# Patient Record
Sex: MALE | Race: WHITE | Employment: OTHER | RURAL
[De-identification: names, ages, dates, MRNs, and addresses within clinical notes are randomized per-mention and may not be internally consistent; named-entity substitution may affect disease eponyms.]

---

## 2021-08-08 ENCOUNTER — HOSPITAL ENCOUNTER (EMERGENCY)
Age: 41
Discharge: HOME | End: 2021-08-08
Attending: EMERGENCY MEDICINE
Payer: MEDICAID

## 2021-08-08 DIAGNOSIS — L08.9 INFECTED INSECT BITE OF ABDOMEN, INITIAL ENCOUNTER: Primary | ICD-10-CM

## 2021-08-08 DIAGNOSIS — S30.861A INFECTED INSECT BITE OF ABDOMEN, INITIAL ENCOUNTER: Primary | ICD-10-CM

## 2021-08-08 PROCEDURE — 99281 EMR DPT VST MAYX REQ PHY/QHP: CPT

## 2021-08-08 RX ORDER — CEPHALEXIN 500 MG/1
500 CAPSULE ORAL 3 TIMES DAILY
Qty: 21 CAPSULE | Refills: 0 | Status: SHIPPED | OUTPATIENT
Start: 2021-08-08 | End: 2021-08-15

## 2021-08-08 NOTE — ED PROVIDER NOTES
[facility]  EMERGENCY DEPARTMENT HISTORY AND PHYSICAL EXAM         Date of Service: 8/8/2021   Patient Name: Rakesh Cai   YOB: 1980  Medical Record Number: [id number]    History of Presenting Illness     Chief Complaint   Patient presents with   Vishnu Torres 82        History Provided By:  patient    Additional History:   Rakesh Cai is a 42 y.o. male who presents ambulatory to the ED with cc of erythematous insect bite on abdomen just below umbilicus. Unsure what bit him. Occurred several days ago. The central bite has been draining, though pt thinks his belt buckle might have dug into it. No F/C. There are no other complaints, changes or physical findings at this time. Primary Care Provider: None   Specialist:    Past History     Past Medical History:   No past medical history on file. Past Surgical History:   No past surgical history on file. Family History:   No family history on file. Social History:   Social History     Tobacco Use    Smoking status: Not on file   Substance Use Topics    Alcohol use: Not on file    Drug use: Not on file        Allergies:   Not on File     Review of Systems   Review of Systems   Constitutional: Negative for appetite change, chills and fever. HENT: Negative for congestion. Eyes: Negative for visual disturbance. Respiratory: Negative for cough, shortness of breath and wheezing. Cardiovascular: Negative for chest pain, palpitations and leg swelling. Gastrointestinal: Negative for abdominal pain. Genitourinary: Negative for dysuria, frequency and urgency. Musculoskeletal: Negative for back pain, joint swelling, myalgias and neck stiffness. Skin: Positive for wound. Negative for rash. Neurological: Negative for dizziness, syncope, weakness and headaches. Hematological: Negative for adenopathy. Psychiatric/Behavioral: Negative for behavioral problems and dysphoric mood.        Physical Exam  Physical Exam  Vitals and nursing note reviewed. Constitutional:       General: He is not in acute distress. Appearance: He is well-developed. HENT:      Head: Normocephalic and atraumatic. Eyes:      General: No scleral icterus. Conjunctiva/sclera: Conjunctivae normal.      Pupils: Pupils are equal, round, and reactive to light. Cardiovascular:      Rate and Rhythm: Normal rate and regular rhythm. Heart sounds: No murmur heard. No gallop. Pulmonary:      Effort: Pulmonary effort is normal. No respiratory distress. Breath sounds: No stridor. No wheezing or rales. Abdominal:      General: Bowel sounds are normal. There is no distension. Palpations: Abdomen is soft. There is no mass. Tenderness: There is no abdominal tenderness. There is no guarding or rebound. Musculoskeletal:         General: Normal range of motion. Cervical back: Normal range of motion and neck supple. Lymphadenopathy:      Cervical: No cervical adenopathy. Skin:     General: Skin is warm and dry. Findings: No erythema or rash. Comments: Anterior abd below umbilicus has a 0.5 cm ulcer with clear drainage, surrounded by 5 cm of erythema and mild induration. No fluctuance. Neurological:      Mental Status: He is alert and oriented to person, place, and time. Cranial Nerves: No cranial nerve deficit. Coordination: Coordination normal.         Medical Decision Making   I am the first provider for this patient. I reviewed the vital signs, available nursing notes, past medical history, past surgical history, family history and social history. Old Medical Records: none     Provider Notes:   DDX: Insect bite, cellulitis     ED Course:  5:06 PM   Initial assessment performed. The patients presenting problems have been discussed, and they are in agreement with the care plan formulated and outlined with them.   I have encouraged them to ask questions as they arise throughout their visit.    Progress Notes:  5:09 PM  Will place on abx; F/U PCP. Kendal Paez MD      Procedures:   Procedures    Diagnostic Study Results   Labs -    No results found for this or any previous visit (from the past 12 hour(s)). Radiologic Studies -  The following have been ordered and reviewed:  No orders to display     CT Results  (Last 48 hours)    None        CXR Results  (Last 48 hours)    None            Vital Signs-Reviewed the patient's vital signs. No data found. Medications Given in the ED:  Medications - No data to display    Diagnosis:  Clinical Impression:   1. Infected insect bite of abdomen, initial encounter         Plan:  1:   Follow-up Information     Follow up With Specialties Details Why [address] [facility]   If symptoms worsen [address]  [phone]          2:   Current Discharge Medication List      START taking these medications    Details   cephALEXin (Keflex) 500 mg capsule Take 1 Capsule by mouth three (3) times daily for 7 days. Qty: 21 Capsule, Refills: 0  Start date: 8/8/2021, End date: 8/15/2021           Return to ED if worse. Disposition:  Home  _______________________________   Attestations: This note was performed by Kendal Paez MD in its entirety.   _______________________________

## 2024-05-16 NOTE — PROGRESS NOTES
Ht 1.829 m (6')   Wt 91.2 kg (201 lb)   BMI 27.26 kg/m²      ORTHO EXAMINATION:  Examination Right knee Left knee   Skin Intact Intact   Range of motion 120-0 115-5   Effusion - -   Medial joint line tenderness - +   Lateral joint line tenderness - -   Popliteal tenderness - -   Osteophytes palpable - -   Echo’s - -   Patella crepitus - -   Anterior drawer - -   Lateral laxity - -   Medial laxity - -   Varus deformity - -   Valgus deformity - -   Pretibial edema - -   Calf tenderness - -   Tenderness over the superficial medial collateral ligament at the attachment site on the femur  RADIOGRAPHS:   5/5/24 XR LT KNEE PT FIRST  No acute fracture.     IMPRESSION:      ICD-10-CM    1. Left knee pain, unspecified chronicity  M25.562 [03599] Knee 3V      2. Sprain of medial collateral ligament of left knee, initial encounter  S83.412A         PLAN:  There is no need for surgery or injection at this time. We discussed a possible need for left knee MRI in the future if pain continues. He will follow up as needed.    Documentation by justin Bains, as documented by Galo Sneed MD.

## 2024-05-17 ENCOUNTER — OFFICE VISIT (OUTPATIENT)
Age: 44
End: 2024-05-17
Payer: MEDICAID

## 2024-05-17 VITALS — BODY MASS INDEX: 27.22 KG/M2 | TEMPERATURE: 97 F | WEIGHT: 201 LBS | HEIGHT: 72 IN

## 2024-05-17 DIAGNOSIS — S83.412A SPRAIN OF MEDIAL COLLATERAL LIGAMENT OF LEFT KNEE, INITIAL ENCOUNTER: ICD-10-CM

## 2024-05-17 DIAGNOSIS — M25.562 LEFT KNEE PAIN, UNSPECIFIED CHRONICITY: Primary | ICD-10-CM

## 2024-05-17 PROCEDURE — 99203 OFFICE O/P NEW LOW 30 MIN: CPT | Performed by: SPECIALIST

## 2025-01-07 ENCOUNTER — HOSPITAL ENCOUNTER (EMERGENCY)
Facility: HOSPITAL | Age: 45
Discharge: HOME | End: 2025-01-07
Payer: MEDICAID

## 2025-01-07 VITALS
DIASTOLIC BLOOD PRESSURE: 99 MMHG | RESPIRATION RATE: 18 BRPM | BODY MASS INDEX: 25.06 KG/M2 | WEIGHT: 185 LBS | HEART RATE: 76 BPM | HEIGHT: 72 IN | OXYGEN SATURATION: 99 % | SYSTOLIC BLOOD PRESSURE: 148 MMHG | TEMPERATURE: 98.2 F

## 2025-01-07 DIAGNOSIS — Z79.899 ENCOUNTER FOR MONITORING SUBOXONE MAINTENANCE THERAPY: Primary | ICD-10-CM

## 2025-01-07 DIAGNOSIS — Z51.81 ENCOUNTER FOR MONITORING SUBOXONE MAINTENANCE THERAPY: Primary | ICD-10-CM

## 2025-01-07 PROCEDURE — 99283 EMERGENCY DEPT VISIT LOW MDM: CPT

## 2025-01-07 PROCEDURE — 6360000002 HC RX W HCPCS: Performed by: NURSE PRACTITIONER

## 2025-01-07 RX ORDER — BUPRENORPHINE HYDROCHLORIDE, NALOXONE HYDROCHLORIDE 8; 2 MG/1; MG/1
1 FILM, SOLUBLE BUCCAL; SUBLINGUAL 2 TIMES DAILY
Qty: 4 FILM | Refills: 0 | Status: SHIPPED | OUTPATIENT
Start: 2025-01-07 | End: 2025-01-09

## 2025-01-07 RX ORDER — BUPRENORPHINE HYDROCHLORIDE AND NALOXONE HYDROCHLORIDE DIHYDRATE 8; 2 MG/1; MG/1
1 TABLET SUBLINGUAL ONCE
Status: COMPLETED | OUTPATIENT
Start: 2025-01-07 | End: 2025-01-07

## 2025-01-07 RX ORDER — BUPRENORPHINE HYDROCHLORIDE, NALOXONE HYDROCHLORIDE 8; 2 MG/1; MG/1
FILM, SOLUBLE BUCCAL; SUBLINGUAL
COMMUNITY
Start: 2024-10-28 | End: 2025-01-07

## 2025-01-07 RX ADMIN — BUPRENORPHINE HYDROCHLORIDE AND NALOXONE HYDROCHLORIDE DIHYDRATE 1 TABLET: 8; 2 TABLET SUBLINGUAL at 16:21

## 2025-01-07 ASSESSMENT — PAIN - FUNCTIONAL ASSESSMENT: PAIN_FUNCTIONAL_ASSESSMENT: NONE - DENIES PAIN

## 2025-01-07 NOTE — DISCHARGE INSTRUCTIONS
[facility] in [facility] Fax: [phone], phone: [phone]    [facility] Fax: [phone], phone: [phone]    [place]:   [place] - [phone]  [place]- [phone]  [place]- [phone]  [place]- [phone]  [place]- [phone]  [place]-[phone]  [facility]- [phone]  [place]- [phone]    [facility]:  [place]-  Fax: [phone], phone [phone] or [phone]   [place]- [phone]    [facility]:  [phone]  POC: Serenity Escobar [phone]    [facility]:  [phone]  POC: Shaan Grey [phone]    Peer support warm line ([place]):  [phone]  POC: James Gutierrez  [phone]    [place]:  [phone]  POC: Aaron Luong [phone]

## 2025-01-07 NOTE — ED TRIAGE NOTES
Patient presented to the Emergency Dept to have dose of suboxone 8mg two times a day.    Patient had last dose 5 days ago. Normally get dose at [facility]    Patient alert and oriented x 4, patient breathes freely on room air in nil cardiopulmonary distress

## 2025-01-07 NOTE — ED PROVIDER NOTES
[facility] EMERGENCY DEPT  EMERGENCY DEPARTMENT HISTORY AND PHYSICAL EXAM      Date: 1/7/2025  Patient Name: Sen Gunderson  MRN: [id number]  YOB: 1980  Date of evaluation: 1/7/2025  Provider: FELISHA Pollard NP   Note Started: 3:13 PM EST 1/7/25      History of Presenting Illness     Chief Complaint   Patient presents with    Medication Refill         History Provided By: Patient and medical chart review.    Additional History (Context): Sen Gunderson is a 44 y.o. male with   Past Medical History:   Diagnosis Date    Encounter for monitoring Suboxone maintenance therapy     Hypertension    who presents with request for Suboxone refill.  States that his last dose was about 5 days ago.  States he ran out the end of November and has been having issues with transportation to be seen at [facility].  States he does not have a car right now.  Patient admits he has been buying the Suboxone in the streets.  States he has been clean.  Patient states it is getting too expensive for him to afford so presents to the ER requesting prescription and help with getting his Suboxone.    PCP: No primary care provider on file.    No current facility-administered medications for this encounter.     Current Outpatient Medications   Medication Sig Dispense Refill    SUBOXONE 8-2 MG FILM SL film Place 1 Film under the tongue in the morning and at bedtime for 2 days. Max Daily Amount: 2 Film 4 Film 0       Past History     Past Medical History:  Past Medical History:   Diagnosis Date    Encounter for monitoring Suboxone maintenance therapy     Hypertension        Past Surgical History:  No past surgical history on file.    Family History:  No family history on file.    Social History:  Social History     Tobacco Use    Smoking status: Every Day     Types: Cigarettes    Smokeless tobacco: Former   Substance Use Topics    Alcohol use: Defer    Drug use: Defer          Allergies:  No Known Allergies    Medications:  No current